# Patient Record
Sex: MALE | NOT HISPANIC OR LATINO | URBAN - METROPOLITAN AREA
[De-identification: names, ages, dates, MRNs, and addresses within clinical notes are randomized per-mention and may not be internally consistent; named-entity substitution may affect disease eponyms.]

---

## 2017-11-06 NOTE — PATIENT DISCUSSION
POSTERIOR VITREOUS DETACHMENT, Ou:  NO HOLES. NO TEARS. RETINAL  DETACHMENT WARNINGS DISCUSSED. FLOATERS AND FLASHES BROCHURE GIVEN. RETURN FOR FOLLOW-UP AS SCHEDULED.

## 2017-12-21 NOTE — PROCEDURE NOTE: CLINICAL
PROCEDURE NOTE: Chalazion Injection Left Upper Lid. Diagnosis: Chalazion. Prior to treatment, the risks/benefits/alternatives were discussed. The patient wished to proceed with procedure. Site of Injection: *. Kenalog *units injected. Patient tolerated procedure well. There were no complications. Post procedure instructions given. Garrett Pierre

## 2019-07-16 ENCOUNTER — IMPORTED ENCOUNTER (OUTPATIENT)
Dept: URBAN - METROPOLITAN AREA CLINIC 1 | Facility: CLINIC | Age: 54
End: 2019-07-16

## 2019-07-16 PROBLEM — H52.03: Noted: 2019-07-16

## 2019-07-16 PROBLEM — H52.4: Noted: 2019-07-16

## 2019-07-16 PROCEDURE — S0620 ROUTINE OPHTHALMOLOGICAL EXA: HCPCS

## 2019-08-28 ENCOUNTER — IMPORTED ENCOUNTER (OUTPATIENT)
Dept: URBAN - METROPOLITAN AREA CLINIC 1 | Facility: CLINIC | Age: 54
End: 2019-08-28

## 2019-08-28 PROBLEM — H11.153: Noted: 2019-08-28

## 2019-08-28 PROBLEM — H25.13: Noted: 2019-08-28

## 2019-08-28 PROBLEM — H40.023: Noted: 2019-08-28

## 2019-08-28 PROBLEM — R73.09: Noted: 2019-08-28

## 2019-08-28 PROCEDURE — 92133 CPTRZD OPH DX IMG PST SGM ON: CPT

## 2019-08-28 PROCEDURE — 92083 EXTENDED VISUAL FIELD XM: CPT

## 2019-08-28 PROCEDURE — 92014 COMPRE OPH EXAM EST PT 1/>: CPT

## 2020-07-17 ENCOUNTER — IMPORTED ENCOUNTER (OUTPATIENT)
Dept: URBAN - METROPOLITAN AREA CLINIC 1 | Facility: CLINIC | Age: 55
End: 2020-07-17

## 2020-07-17 PROBLEM — H52.03: Noted: 2020-07-17

## 2020-07-17 PROBLEM — H52.4: Noted: 2020-07-17

## 2020-07-17 PROCEDURE — S0621 ROUTINE OPHTHALMOLOGICAL EXA: HCPCS

## 2020-09-02 ENCOUNTER — IMPORTED ENCOUNTER (OUTPATIENT)
Dept: URBAN - METROPOLITAN AREA CLINIC 1 | Facility: CLINIC | Age: 55
End: 2020-09-02

## 2020-09-02 PROBLEM — H25.13: Noted: 2020-09-02

## 2020-09-02 PROBLEM — R73.09: Noted: 2020-09-02

## 2020-09-02 PROBLEM — H40.023: Noted: 2020-09-02

## 2020-09-02 PROBLEM — H11.153: Noted: 2020-09-02

## 2020-09-02 PROCEDURE — 92014 COMPRE OPH EXAM EST PT 1/>: CPT

## 2021-07-23 ENCOUNTER — IMPORTED ENCOUNTER (OUTPATIENT)
Dept: URBAN - METROPOLITAN AREA CLINIC 1 | Facility: CLINIC | Age: 56
End: 2021-07-23

## 2021-07-23 PROBLEM — H52.03: Noted: 2021-07-23

## 2021-07-23 PROBLEM — H52.223: Noted: 2021-07-23

## 2021-07-23 PROBLEM — H52.4: Noted: 2021-07-23

## 2021-07-23 PROCEDURE — S0621 ROUTINE OPHTHALMOLOGICAL EXA: HCPCS

## 2021-09-01 ENCOUNTER — IMPORTED ENCOUNTER (OUTPATIENT)
Dept: URBAN - METROPOLITAN AREA CLINIC 1 | Facility: CLINIC | Age: 56
End: 2021-09-01

## 2021-09-01 PROBLEM — H11.153: Noted: 2021-09-01

## 2021-09-01 PROBLEM — H25.13: Noted: 2021-09-01

## 2021-09-01 PROBLEM — H40.023: Noted: 2021-09-01

## 2021-09-01 PROBLEM — R73.09: Noted: 2021-09-01

## 2021-09-01 PROCEDURE — 92014 COMPRE OPH EXAM EST PT 1/>: CPT

## 2021-09-01 PROCEDURE — 92133 CPTRZD OPH DX IMG PST SGM ON: CPT

## 2021-09-01 PROCEDURE — 92015 DETERMINE REFRACTIVE STATE: CPT

## 2022-04-02 ASSESSMENT — TONOMETRY
OD_IOP_MMHG: 16
OS_IOP_MMHG: 16
OS_IOP_MMHG: 16
OD_IOP_MMHG: 16
OD_IOP_MMHG: 15
OS_IOP_MMHG: 16
OS_IOP_MMHG: 17
OS_IOP_MMHG: 15
OS_IOP_MMHG: 18
OD_IOP_MMHG: 15
OD_IOP_MMHG: 17
OD_IOP_MMHG: 16

## 2022-04-02 ASSESSMENT — VISUAL ACUITY
OD_CC: 20/30
OD_SC: 20/20
OS_CC: 20/25-1
OD_CC: 20/20
OS_SC: 20/20
OD_SC: 20/20
OS_CC: J1
OS_CC: 20/20
OS_CC: 20/30
OS_CC: J1
OD_CC: J1
OD_CC: J2-2
OS_SC: 20/20
OS_CC: J1
OD_CC: 20/50
OD_SC: 20/20
OS_SC: 20/20
OS_SC: 20/20
OS_CC: J2-2
OD_CC: J1
OD_CC: J1
OU_SC: 20/20
OD_SC: 20/20

## 2022-07-29 ENCOUNTER — ESTABLISHED PATIENT (OUTPATIENT)
Dept: URBAN - METROPOLITAN AREA CLINIC 1 | Facility: CLINIC | Age: 57
End: 2022-07-29

## 2022-07-29 DIAGNOSIS — H52.4: ICD-10-CM

## 2022-07-29 DIAGNOSIS — Z01.00: ICD-10-CM

## 2022-07-29 PROCEDURE — 92014 COMPRE OPH EXAM EST PT 1/>: CPT

## 2022-07-29 ASSESSMENT — VISUAL ACUITY
OD_CC: J1+
OS_CC: J1+
OD_CC: 20/15
OS_CC: 20/15

## 2022-07-29 ASSESSMENT — TONOMETRY
OS_IOP_MMHG: 16
OD_IOP_MMHG: 16

## 2022-11-09 ENCOUNTER — COMPREHENSIVE EXAM (OUTPATIENT)
Dept: URBAN - METROPOLITAN AREA CLINIC 1 | Facility: CLINIC | Age: 57
End: 2022-11-09

## 2022-11-09 DIAGNOSIS — E11.9: ICD-10-CM

## 2022-11-09 DIAGNOSIS — H40.023: ICD-10-CM

## 2022-11-09 DIAGNOSIS — H11.153: ICD-10-CM

## 2022-11-09 DIAGNOSIS — H25.13: ICD-10-CM

## 2022-11-09 PROCEDURE — 92014 COMPRE OPH EXAM EST PT 1/>: CPT

## 2022-11-09 PROCEDURE — 92133 CPTRZD OPH DX IMG PST SGM ON: CPT

## 2022-11-09 ASSESSMENT — TONOMETRY
OD_IOP_MMHG: 16
OS_IOP_MMHG: 16

## 2022-11-09 ASSESSMENT — VISUAL ACUITY
OD_CC: J1
OD_CC: 20/20
OS_CC: 20/20
OS_CC: J1

## 2023-08-04 ENCOUNTER — COMPREHENSIVE EXAM (OUTPATIENT)
Dept: URBAN - METROPOLITAN AREA CLINIC 1 | Facility: CLINIC | Age: 58
End: 2023-08-04

## 2023-08-04 DIAGNOSIS — H52.4: ICD-10-CM

## 2023-08-04 DIAGNOSIS — H52.03: ICD-10-CM

## 2023-08-04 DIAGNOSIS — H52.223: ICD-10-CM

## 2023-08-04 PROCEDURE — 92015 DETERMINE REFRACTIVE STATE: CPT

## 2023-08-04 PROCEDURE — 92014 COMPRE OPH EXAM EST PT 1/>: CPT

## 2023-08-04 ASSESSMENT — VISUAL ACUITY
OD_CC: J1+
OS_CC: J1+
OD_CC: 20/20-1
OS_CC: 20/20-1

## 2023-08-04 ASSESSMENT — TONOMETRY
OD_IOP_MMHG: 14
OS_IOP_MMHG: 14

## 2023-11-17 ENCOUNTER — COMPREHENSIVE EXAM (OUTPATIENT)
Dept: URBAN - METROPOLITAN AREA CLINIC 1 | Facility: CLINIC | Age: 58
End: 2023-11-17

## 2023-11-17 DIAGNOSIS — H11.153: ICD-10-CM

## 2023-11-17 DIAGNOSIS — H40.023: ICD-10-CM

## 2023-11-17 DIAGNOSIS — E11.9: ICD-10-CM

## 2023-11-17 DIAGNOSIS — H25.813: ICD-10-CM

## 2023-11-17 PROCEDURE — 92014 COMPRE OPH EXAM EST PT 1/>: CPT

## 2023-11-17 ASSESSMENT — VISUAL ACUITY
OD_CC: J1+
OS_CC: J1+
OD_CC: 20/20
OS_CC: 20/20

## 2023-11-17 ASSESSMENT — TONOMETRY
OD_IOP_MMHG: 16
OS_IOP_MMHG: 16

## 2024-08-09 ENCOUNTER — COMPREHENSIVE EXAM (OUTPATIENT)
Dept: URBAN - METROPOLITAN AREA CLINIC 1 | Facility: CLINIC | Age: 59
End: 2024-08-09

## 2024-08-09 DIAGNOSIS — H52.223: ICD-10-CM

## 2024-08-09 DIAGNOSIS — Z01.00: ICD-10-CM

## 2024-08-09 DIAGNOSIS — H52.03: ICD-10-CM

## 2024-08-09 DIAGNOSIS — H52.4: ICD-10-CM

## 2024-08-09 PROCEDURE — 92015 DETERMINE REFRACTIVE STATE: CPT

## 2024-08-09 PROCEDURE — 92014 COMPRE OPH EXAM EST PT 1/>: CPT

## 2024-08-09 ASSESSMENT — VISUAL ACUITY
OS_CC: 20/20
OS_CC: J1+
OD_CC: 20/20
OD_CC: J1+

## 2024-08-09 ASSESSMENT — TONOMETRY
OD_IOP_MMHG: 14
OS_IOP_MMHG: 13

## 2024-11-22 ENCOUNTER — COMPREHENSIVE EXAM (OUTPATIENT)
Dept: URBAN - METROPOLITAN AREA CLINIC 1 | Facility: CLINIC | Age: 59
End: 2024-11-22

## 2024-11-22 DIAGNOSIS — E11.9: ICD-10-CM

## 2024-11-22 DIAGNOSIS — H11.153: ICD-10-CM

## 2024-11-22 DIAGNOSIS — H40.023: ICD-10-CM

## 2024-11-22 DIAGNOSIS — H25.813: ICD-10-CM

## 2024-11-22 PROCEDURE — 92133 CPTRZD OPH DX IMG PST SGM ON: CPT

## 2024-11-22 PROCEDURE — 92014 COMPRE OPH EXAM EST PT 1/>: CPT
